# Patient Record
Sex: FEMALE | Race: WHITE | Employment: FULL TIME | URBAN - METROPOLITAN AREA
[De-identification: names, ages, dates, MRNs, and addresses within clinical notes are randomized per-mention and may not be internally consistent; named-entity substitution may affect disease eponyms.]

---

## 2017-04-05 PROBLEM — M85.80 OSTEOPENIA: Status: ACTIVE | Noted: 2017-04-05

## 2018-11-12 PROCEDURE — 88305 TISSUE EXAM BY PATHOLOGIST: CPT | Performed by: INTERNAL MEDICINE

## 2020-09-09 PROBLEM — S43.102D: Status: ACTIVE | Noted: 2020-09-09

## 2021-11-01 ENCOUNTER — LAB REQUISITION (OUTPATIENT)
Dept: SURGERY | Age: 66
End: 2021-11-01
Payer: MEDICARE

## 2021-11-01 DIAGNOSIS — Z01.818 PREOP EXAMINATION: ICD-10-CM

## 2023-07-31 ENCOUNTER — OFFICE VISIT (OUTPATIENT)
Facility: CLINIC | Age: 68
End: 2023-07-31
Payer: MEDICARE

## 2023-07-31 VITALS
RESPIRATION RATE: 16 BRPM | WEIGHT: 124.63 LBS | BODY MASS INDEX: 23.53 KG/M2 | SYSTOLIC BLOOD PRESSURE: 170 MMHG | HEIGHT: 61 IN | DIASTOLIC BLOOD PRESSURE: 84 MMHG | HEART RATE: 62 BPM

## 2023-07-31 DIAGNOSIS — Z01.419 WELL WOMAN EXAM WITH ROUTINE GYNECOLOGICAL EXAM: Primary | ICD-10-CM

## 2023-07-31 PROCEDURE — 99202 OFFICE O/P NEW SF 15 MIN: CPT | Performed by: OBSTETRICS & GYNECOLOGY

## 2023-07-31 RX ORDER — TELMISARTAN 20 MG/1
TABLET ORAL
COMMUNITY
Start: 2023-07-19

## 2023-07-31 NOTE — PROGRESS NOTES
She has noticed a left inguinal soft mass that comes and goes.   Probable hernia we will refer her to general surgery

## 2024-10-15 ENCOUNTER — OFFICE VISIT (OUTPATIENT)
Facility: CLINIC | Age: 69
End: 2024-10-15
Payer: MEDICARE

## 2024-10-15 VITALS
SYSTOLIC BLOOD PRESSURE: 128 MMHG | HEIGHT: 61 IN | BODY MASS INDEX: 22.84 KG/M2 | HEART RATE: 60 BPM | DIASTOLIC BLOOD PRESSURE: 68 MMHG | WEIGHT: 121 LBS

## 2024-10-15 DIAGNOSIS — Z12.4 ENCOUNTER FOR SCREENING FOR MALIGNANT NEOPLASM OF CERVIX: Primary | ICD-10-CM

## 2024-10-15 DIAGNOSIS — Z12.31 ENCOUNTER FOR SCREENING MAMMOGRAM FOR BREAST CANCER: ICD-10-CM

## 2024-10-15 PROCEDURE — G0101 CA SCREEN;PELVIC/BREAST EXAM: HCPCS | Performed by: OBSTETRICS & GYNECOLOGY

## 2024-10-15 PROCEDURE — 88175 CYTOPATH C/V AUTO FLUID REDO: CPT | Performed by: OBSTETRICS & GYNECOLOGY

## 2024-10-15 RX ORDER — ATORVASTATIN CALCIUM 20 MG/1
TABLET, FILM COATED ORAL
COMMUNITY
Start: 2024-10-02

## 2024-10-15 NOTE — PROGRESS NOTES
Veronica Bonilla is a 69 year old female  No LMP recorded. Patient has had a hysterectomy.   Chief Complaint   Patient presents with    Wellness Visit     WWE  - No complaints   .     I was unable to find her pathology report from her hysterectomy in  it was done with urogynecology for prolapse.  She never had a colposcopy.  She wishes a Pap smear today's after encouragement from her primary.    Vitamin D was discussed.  Blood work is done with her primary.  She has had a bone density.  She just did a Cologuard.    OBSTETRICS HISTORY:  OB History    Para Term  AB Living   3 2 2 0 1 2   SAB IAB Ectopic Multiple Live Births   1 0 0 2 2      # Outcome Date GA Lbr Naveed/2nd Weight Sex Type Anes PTL Lv   3 Term     M NORMAL SPONT      2 Term     M NORMAL SPONT      1 SAB                GYNE HISTORY:  Periods none due to hysterectomy    History   Sexual Activity    Sexual activity: Not on file                 MEDICAL HISTORY:  Past Medical History:    CAD (coronary artery disease)     cardiac calcium score 85    ADAM I (cervical intraepithelial neoplasia I)    HTN (hypertension)    Hyperlipidemia    Osteopenia       SURGICAL HISTORY:  Past Surgical History:   Procedure Laterality Date    Colonoscopy  2018    MAC: four small polyps (tubular adenomas), int hem, repeat     Cryocautery of cervix      Evaluate only, bladder (dmg)      Hysterectomy      with bso - 2010    Other surgical history  2023    hernia repair       SOCIAL HISTORY:  Social History     Socioeconomic History    Marital status:      Spouse name: Not on file    Number of children: Not on file    Years of education: Not on file    Highest education level: Not on file   Occupational History    Not on file   Tobacco Use    Smoking status: Never    Smokeless tobacco: Never   Substance and Sexual Activity    Alcohol use: Yes     Alcohol/week: 8.0 standard drinks of alcohol     Types: 2 Glasses of wine, 6  Standard drinks or equivalent per week     Comment: beer/wine weekends    Drug use: No    Sexual activity: Not on file   Other Topics Concern    Caffeine Concern No    Exercise No    Seat Belt No    Special Diet No    Stress Concern No    Weight Concern No   Social History Narrative     - 1985  . 1 son- 25 norris ; 1s- 22 kaitlynn stressful ; works with . [employer]. Has health club membership.     New grandchild 2021     Social Drivers of Health     Financial Resource Strain: Not on file   Food Insecurity: Not on file   Transportation Needs: Not on file   Physical Activity: Not on file   Stress: Not on file   Social Connections: Not on file   Housing Stability: Not on file       FAMILY HISTORY:  Family History   Problem Relation Age of Onset    Other (Other[other]) Father         electrocution    Colon Cancer Mother         dx age 60s    Obesity Mother     No Known Problems Son     No Known Problems Son     No Known Problems Maternal Grandmother     No Known Problems Maternal Grandfather     No Known Problems Paternal Grandmother     No Known Problems Paternal Grandfather     No Known Problems Sister     Cancer Brother         bladder cancer dx age 60s    Cancer Maternal Uncle         unsure of type or dx age    Cancer Maternal Uncle         unsure of type or dx age    Prostate Cancer Neg     Pancreatic Cancer Neg     Psychiatric Neg     Breast Cancer Neg     Ovarian Cancer Neg     Uterine Cancer Neg        MEDICATIONS:    Current Outpatient Medications:     atorvastatin 20 MG Oral Tab, , Disp: , Rfl:     Telmisartan 20 MG Oral Tab, , Disp: , Rfl:     HYDROCHLOROTHIAZIDE 25 MG Oral Tab, TAKE ONE TABLET BY MOUTH DAILY, Disp: 60 tablet, Rfl: 0    Aspirin 81 MG Oral Tab, Take 1 tablet (81 mg total) by mouth daily., Disp: , Rfl:     ALLERGIES:  Allergies[1]      Review of Systems:  Constitutional:  Denies fatigue, night sweats, hot flashes  Gastrointestinal:  denies heartburn, abdominal pain,  diarrhea or constipation  Genitourinary:  denies dysuria, incontinence, abnormal vaginal discharge, vaginal itching  Skin/Breast:  Denies any breast pain, lumps, or discharge.   Neurological:  denies headaches, extremity weakness or numbness.      PHYSICAL EXAM:   Constitutional: well developed, well nourished  Head/Face: normocephalic  Neck/Thyroid: thyroid symmetric, no thyromegaly, no nodules, no adenopathy  Breast: normal without palpable masses, tenderness, asymmetry, nipple discharge, nipple retraction or skin changes  Abdomen:  soft, nontender, nondistended, no masses  Skin/Hair: no unusual rashes or bruises   Extremities: no edema, no cyanosis  Psychiatric:  Oriented to time, place, person and situation. Appropriate mood and affect    Pelvic Exam:  External Genitalia: normal appearance, hair distribution, and no lesions  Urethral Meatus:  normal in size, location, without lesions and prolapse  Bladder:  No fullness, masses or tenderness  Vagina:  Normal appearance without lesions, no abnormal discharge  Cervix: Absent  Uterus: Absent  Adnexa: normal without masses or tenderness  Perineum: normal  Anus: no hemorroids     Assessment & Plan:  [place] was seen today for wellness visit.    Diagnoses and all orders for this visit:    Encounter for screening for malignant neoplasm of cervix  -     ThinPrep PAP Smear B; Future                       [1] No Known Allergies

## 2024-10-22 DIAGNOSIS — Z01.419 ENCOUNTER FOR GYNECOLOGICAL EXAMINATION WITHOUT ABNORMAL FINDING: Primary | ICD-10-CM

## 2024-10-22 LAB
.: NORMAL
.: NORMAL

## 2024-10-23 LAB — HPV E6+E7 MRNA CVX QL NAA+PROBE: NEGATIVE

## (undated) NOTE — LETTER
CARLOS Notifier: Getting-in. Patient Name: Veronica Bonilla Identification Number: [id number]                          Advance Beneficiary Notice of Noncoverage (ABN)   NOTE:  If Medicare doesn’t pay for D. item/service(s) below, you may have to pay.  Medicare does not pay for everything, even some care that you or your health care provider have good reason to think you need. We expect Medicare may not pay for the D. item/service(s) below.  D. Items or Services E. Reason Medicare May Not Pay: F. Estimated Cost   __ EKG ($87.00)  _x_ Pap smear ($101) _x_Pelvic/Breast ($147.00)  __ Ear Irrigation ($138)  __ Injection(s)  ___ Tdap ($181)       ___ Meningitis ($290)   __Prevnar ($555)  ___ Td ($66)              ___ Prevnar 20 ($549)  ___ Hep A ($152)     ___ Prolia ($1827)         __ Xiaflex ($            )   ___ Hep B ($150)     ___ Pneumovax ($287)                                         ___ Vaccine Administration ($65)   _x_ Medicare does not cover this service      _x_ Medicare may not pay for this   item/service for your condition     _x_ Medicare may not pay for this item/service as often as this        $248.00   WHAT YOU NEED TO DO NOW:  Read this notice, so you can make an informed decision about your care.  Ask us any questions that you may have after you finish reading.  Choose an option below about whether to receive the D. item/service(s) listed above.  Note: If you choose Option 1 or 2, we may help you to use any other insurance that you might have, but Medicare cannot require us to do this.  G. OPTIONS: Check only one box.  We cannot choose a box for you.   OPTION 1. I want the D. item/service(s) listed above. You may ask to be paid now, but I also want Medicare billed for an official decision on payment, which is sent to me on a Medicare Summary Notice (MSN). I understand that if Medicare doesn’t pay, I am responsible for payment, but I can appeal to Medicare by following the  directions on the MSN. If Medicare does pay, you will refund any payments I made to you, less co-pays or deductibles.  OPTION 2. I want the D. item/service(s) listed above, but do not bill Medicare. You may ask to be paid now as I am responsible for payment. I cannot appeal if Medicare is not billed.  OPTION 3. I don't want the D. item/service(s) listed above. I understand with this choice I am not responsible for payment, and I cannot appeal to see if Medicare would pay.    H. Additional Information:    This notice gives our opinion, not an official Medicare decision. If you have other questions on this notice or Medicare billing, call 1-800-MEDICARE ([phone]/TTY: [phone]). Signing below means that you have received and understand this notice. You also receive a copy.  I. Signature: J. Date:       You have the right to get Medicare information in an accessible format, like large print, Braille, or audio. You also have the right to file a complaint if you feel you’ve been discriminated against. Visit Medicare.gov/about- us/esrcjmvgeditz-dekwqwhdmlcudkafs-txbwvb.  According to the Paperwork Reduction Act of 1995, no persons are required to respond to a collection of information unless it displays a valid OMB control number. The valid OMB control number for this information collection is 7244-2496. The time required to complete this information collection is estimated to average 7 minutes per response, including the time to review instructions, search existing data resources, gather the data needed, and complete and review the information collection. If you have comments concerning the accuracy of the time estimate or suggestions for improving this form, please write to: CMS, [address]     Ne[place]ali Attn: SUSHMA Reports Clearance Officer, [place], [place] [address].  Form CMS-R-131 (Exp. 1/31/2026) Form Approved OMB No. 3947-2069